# Patient Record
(demographics unavailable — no encounter records)

---

## 2025-06-04 NOTE — REVIEW OF SYSTEMS
[Negative] : Heme/Lymph [FreeTextEntry2] : as per hpi [FreeTextEntry6] : as per phi [FreeTextEntry8] : as per hpi

## 2025-06-04 NOTE — HISTORY OF PRESENT ILLNESS
[FreeTextEntry1] : CPE [de-identified] : 1.13mg per week from compounding pharmacy has lost 10 pounds on it so far in 4 months  has been getting adderall taking TID through Dr Brooke  notes intermittent sensation of discomfort after sex which he assumes his prostate, hasn't happened in many months  notes that after he gets a uri, he develops mucous production and cough for 1-2 months following this and he is concenred that it is related to his long marijuana smoking history

## 2025-06-04 NOTE — HEALTH RISK ASSESSMENT
[Never] : Never [Yes] : Yes [2 - 4 times a month (2 pts)] : 2-4 times a month (2 points) [5 or 6 (2 pts)] : 5 or 6 (2  points) [Monthly (2 pts)] : Monthly (2 points) [0] : 2) Feeling down, depressed, or hopeless: Not at all (0) [PHQ-2 Negative - No further assessment needed] : PHQ-2 Negative - No further assessment needed [Employed] : employed [Sexually Active] : sexually active [Audit-CScore] : 6 [de-identified] : lift weights, running a little bit but not much recently; 3 days a week [de-identified] : was on low carb diet for 10 years, but has now been eating carbs; mostly cooking at home, not much fast food [[id number]] : 0 [High Risk Behavior] : no high risk behavior [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